# Patient Record
Sex: FEMALE | Race: WHITE
[De-identification: names, ages, dates, MRNs, and addresses within clinical notes are randomized per-mention and may not be internally consistent; named-entity substitution may affect disease eponyms.]

---

## 2023-01-01 ENCOUNTER — HOSPITAL ENCOUNTER (EMERGENCY)
Dept: HOSPITAL 33 - ED | Age: 48
Discharge: HOME | End: 2023-01-01
Payer: COMMERCIAL

## 2023-01-01 VITALS — SYSTOLIC BLOOD PRESSURE: 114 MMHG | HEART RATE: 60 BPM | DIASTOLIC BLOOD PRESSURE: 63 MMHG

## 2023-01-01 VITALS — OXYGEN SATURATION: 98 %

## 2023-01-01 DIAGNOSIS — Z28.310: ICD-10-CM

## 2023-01-01 DIAGNOSIS — Z79.899: ICD-10-CM

## 2023-01-01 DIAGNOSIS — E11.9: ICD-10-CM

## 2023-01-01 DIAGNOSIS — Z72.0: ICD-10-CM

## 2023-01-01 DIAGNOSIS — I10: ICD-10-CM

## 2023-01-01 DIAGNOSIS — R11.2: ICD-10-CM

## 2023-01-01 DIAGNOSIS — E78.5: ICD-10-CM

## 2023-01-01 DIAGNOSIS — Z79.84: ICD-10-CM

## 2023-01-01 DIAGNOSIS — R06.02: ICD-10-CM

## 2023-01-01 DIAGNOSIS — R07.89: Primary | ICD-10-CM

## 2023-01-01 LAB
ALBUMIN SERPL-MCNC: 4.4 G/DL (ref 3.5–5)
ALP SERPL-CCNC: 48 U/L (ref 38–126)
ALT SERPL-CCNC: 40 U/L (ref 0–35)
AMYLASE SERPL-CCNC: 88 U/L (ref 30–110)
ANION GAP SERPL CALC-SCNC: 10.6 MEQ/L (ref 5–15)
AST SERPL QL: 35 U/L (ref 14–36)
BASOPHILS # BLD AUTO: 0.1 X10^3/UL (ref 0–0.4)
BILIRUB BLD-MCNC: 0.3 MG/DL (ref 0.2–1.3)
BNP SERPL-MCNC: 124 PG/ML (ref 0–450)
BUN SERPL-MCNC: 20 MG/DL (ref 7–17)
CALCIUM SPEC-MCNC: 9.1 MG/DL (ref 8.4–10.2)
CHLORIDE SERPL-SCNC: 108 MMOL/L (ref 98–107)
CO2 SERPL-SCNC: 24 MMOL/L (ref 22–30)
CREAT SERPL-MCNC: 0.81 MG/DL (ref 0.52–1.04)
EOSINOPHIL # BLD AUTO: 0.58 X10^3/UL (ref 0–0.5)
GFR SERPLBLD BASED ON 1.73 SQ M-ARVRAT: > 60 ML/MIN
GLUCOSE SERPL-MCNC: 98 MG/DL (ref 74–106)
GLUCOSE UR-MCNC: NEGATIVE MG/DL
HCT VFR BLD AUTO: 41 % (ref 35–47)
HGB BLD-MCNC: 13.3 G/DL (ref 12–16)
LIPASE SERPL-CCNC: 145 U/L (ref 23–300)
LYMPHOCYTES # SPEC AUTO: 2.58 X10^3/UL (ref 1–4.6)
MCH RBC QN AUTO: 29.9 PG (ref 26–32)
MCHC RBC AUTO-ENTMCNC: 32.4 G/DL (ref 32–36)
MONOCYTES # BLD AUTO: 0.62 X10^3/UL (ref 0–1.3)
PLATELET # BLD AUTO: 260 X10^3/UL (ref 150–450)
POTASSIUM SERPLBLD-SCNC: 4.6 MMOL/L (ref 3.5–5.1)
PROT SERPL-MCNC: 7.4 G/DL (ref 6.3–8.2)
PROT UR STRIP-MCNC: NEGATIVE MG/DL
RBC # BLD AUTO: 4.45 X10^6/UL (ref 4.1–5.4)
RBC # UR AUTO: (no result) ERY/UL (ref 0–5)
RBC #/AREA URNS HPF: (no result) /HPF (ref 0–2)
SODIUM SERPL-SCNC: 138 MMOL/L (ref 137–145)
UA DIPSTICK PNL UR: (no result)
URINE CULTURED INDICATED?: NO
WBC # BLD AUTO: 7.6 X10^3/UL (ref 4–10.5)
WBC #/AREA URNS HPF: (no result) /HPF (ref 0–5)

## 2023-01-01 PROCEDURE — 82150 ASSAY OF AMYLASE: CPT

## 2023-01-01 PROCEDURE — 84484 ASSAY OF TROPONIN QUANT: CPT

## 2023-01-01 PROCEDURE — 80053 COMPREHEN METABOLIC PANEL: CPT

## 2023-01-01 PROCEDURE — 93005 ELECTROCARDIOGRAM TRACING: CPT

## 2023-01-01 PROCEDURE — 83880 ASSAY OF NATRIURETIC PEPTIDE: CPT

## 2023-01-01 PROCEDURE — 85025 COMPLETE CBC W/AUTO DIFF WBC: CPT

## 2023-01-01 PROCEDURE — 81015 MICROSCOPIC EXAM OF URINE: CPT

## 2023-01-01 PROCEDURE — 85379 FIBRIN DEGRADATION QUANT: CPT

## 2023-01-01 PROCEDURE — 36415 COLL VENOUS BLD VENIPUNCTURE: CPT

## 2023-01-01 PROCEDURE — 71045 X-RAY EXAM CHEST 1 VIEW: CPT

## 2023-01-01 PROCEDURE — 83690 ASSAY OF LIPASE: CPT

## 2023-01-01 PROCEDURE — 99284 EMERGENCY DEPT VISIT MOD MDM: CPT

## 2023-01-01 PROCEDURE — 83605 ASSAY OF LACTIC ACID: CPT

## 2023-01-01 PROCEDURE — 96374 THER/PROPH/DIAG INJ IV PUSH: CPT

## 2023-01-01 NOTE — ERPHSYRPT
- History of Present Illness


Time Seen by Provider: 01/01/23 10:50


Historian: patient


Exam Limitations: no limitations


Allergies/Adverse Reactions: 








amoxicillin Allergy (Verified 01/01/23 10:49)


   


ampicillin Allergy (Verified 01/01/23 10:49)


   








- Past Medical History


Neurological History: Migraines, Seizures


Cardiac History: High Cholesterol, Hypertension


Respiratory History: Bronchitis


Endocrine Medical History: Diabetes Type II, Hypothyroidism


Musculoskeletal History: Arthritis





- Departure


Referrals: 


RACHAEL LAMB MD [Primary Care Provider] - Follow up/PCP as directed

## 2023-01-01 NOTE — XRAY
Indication: Chest pain.  Short of breath.



Comparison: None



Portable chest inflated and clear with incidental tiny left lung calcified

granuloma.  Heart not enlarged.  Bony thorax intact.



Impression: Nonacute chest.

## 2023-01-01 NOTE — ERPHSYRPT
- History of Present Illness


Time Seen by Provider: 23 10:50


Historian: patient


Exam Limitations: no limitations


Patient Subjective Stated Complaint: C/O chest pain that started aroun 0830 or 

0900 today. Patient states that the pain awoke her from her sleep. She took one 

nitro at home prior to coming into the ER.


Triage Nursing Assessment: Patient ambulated back to ED; refused a w/c. She is 

alert and oriented. NO SOB and no cough noted. GREEN[place] WNL. Skin tone normal.


Physician History: 





Patient is a 47-year-old white female who presents with a complaint of chest 

pain going on the left arm without relief with 1 nitroglycerin.  She had her 

last episode of chest pain approximately a week ago she has had no history of MI

no catheterization she has had nausea vomiting and shortness of breath she has 

had some wheezing.  She is also a smoker she is hypertensive family history is 

positive she is diabetic and she has hyperlipidemia.


Timing/Duration: today


Activities at Onset: none


Quality: fullness


Location: substernal


Chest Pain Radiation: arm


Severity of Pain-Max: moderate


Severity of Pain-Current: mild


Associated Symptoms: nausea, vomiting, shortness of breath, diaphoresis


Prior Chest Pain/Cardiac Workup: no prior cardiac workup


Nitro Today/Relief: 0.4 mg x 1


Aspirin Treatment Today: no aspirin today


Allergies/Adverse Reactions: 








amoxicillin Allergy (Verified 23 10:49)


   


ampicillin Allergy (Verified 23 10:49)


   


Influenza Virus Vaccines Allergy (Verified 23 11:03)


   


morphine Adverse Reaction (Verified 23 11:03)


   





Home Medications: 








Cyclobenzaprine HCl 10 mg*** [Cyclobenzaprine 10 MG***] 10 mg PO TID 23 

[History]


Divalproex Sodium [Depakote ER] 500 mg PO BID 23 [History]


Doxepin HCl 10 mg PO HS 23 [History]


Fenofibric Acid (Choline) [Trilipix] 135 mg PO DAILY 23 [History]


Ibuprofen [Advil Migraine] 200 mg PO DAILY PRN PRN 23 [History]


Levothyroxine Sodium [Levothyroxine] 137 mcg PO DAILY 23 [History]


Mag Oxide/D3/Turmeric Rt Xt [Magnesium-Vit D3-Turmeric Tab] 400 mg PO HS 

23 [History]


Metformin HCl [Metformin HCl ER] 500 mg PO DAILY 23 [History]


Mv-Mn/Iron/Folic Acid/Herb 190 [Vitamin D3 Complete Caplet] 50,000 iu PO WEEKLY 

23 [History]


Nitroglycerin 0.4 mg Tablet*** [Nitrostat 0.4 MG Tablet***] 0.4 mg SL DAILY PRN 

PRN 23 [History]


Pravastatin Sodium 20 mg PO DAILY 23 [History]


Rizatriptan Benzoate [Rizatriptan] 10 mg PO DAILY PRN PRN 23 [History]


Ropinirole HCl 1 mg PO HS 23 [History]


atenoloL [Atenolol] 25 mg PO DAILY 23 [History]





Hx Tetanus, Diphtheria Vaccination/Date Given: Yes


Hx Influenza Vaccination/Date Given: No (Allergy)


Hx Pneumococcal Vaccination/Date Given: No


Immunizations Up to Date: Yes





Travel Risk





- International Travel


Have you traveled outside of the country in past 3 weeks: No





- Coronavirus Screening


Are you exhibiting any of the following symptoms?: No


Close contact with a COVID-19 positive Pt in past 14-21 Days: No





- Vaccine Status


Have you recieved a Covid-19 vaccination: No





- Review of Systems


Constitutional: No Fever, No Chills


Eyes: No Symptoms


Ears, Nose, & Throat: No Symptoms


Respiratory: Cough, No Dyspnea


Cardiac: No Chest Pain, No Edema, No Syncope


Abdominal/Gastrointestinal: No Abdominal Pain, No Nausea, No Vomiting, No 

Diarrhea


Genitourinary Symptoms: No Dysuria


Musculoskeletal: No Back Pain, No Neck Pain


Skin: No Rash


Neurological: No Dizziness, No Focal Weakness, No Sensory Changes


Psychological: No Symptoms


Endocrine: No Symptoms


All Other Systems: Reviewed and Negative





- Past Medical History


Neurological History: Migraines, Seizures


Cardiac History: High Cholesterol, Hypertension


Respiratory History: Bronchitis


Endocrine Medical History: Diabetes Type II, Hypothyroidism


Musculoskeletal History: Arthritis





- Past Surgical History


Past Surgical History: Yes


Female Surgical History:  Section


Other Surgical History: Breast biopsies, thyroid removed





- Social History


Smoking Status: Current every day smoker


How long have you smoked: 11 y.o.


Exposure to second hand smoke: No


Drug Use: none


Patient Lives Alone: No





- Female History


Hx Last Menstrual Period: No longer has these


Hx Pregnant Now: No





- Nursing Vital Signs


Nursing Vital Signs: 





                               Initial Vital Signs











Temperature  98.3 F   23 10:49


 


Pulse Rate  71   23 10:49


 


Respiratory Rate  18   23 10:49


 


Blood Pressure  161/98   23 10:49


 


O2 Sat by Pulse Oximetry  97   23 10:49








                                   Pain Scale











Pain Intensity                 0

















- Physical Exam


General Appearance: no apparent distress, alert


Eye Exam: PERRL/EOMI, eyes nml inspection


Ears, Nose, Throat Exam: normal ENT inspection, moist mucous membranes


Neck Exam: normal inspection, non-tender, supple, full range of motion


Respiratory Exam: normal breath sounds, lungs clear, No respiratory distress


Cardiovascular Exam: regular rate/rhythm, normal heart sounds


Gastrointestinal/Abdomen Exam: soft, No tenderness, No mass


Back Exam: normal inspection, No CVA tenderness, No vertebral tenderness


Extremity Exam: normal inspection, normal range of motion


Neurologic Exam: alert, oriented x 3, cooperative, normal mood/affect, sensation

 nml, No motor deficits


Skin Exam: normal color, warm, dry


SpO2: 98





- Course


Nursing assessment & vital signs reviewed: Yes





- Radiology Exams


  ** Chest


X-ray Interpretation: Interpreted by me, Negative


Ordered Tests: 





                               Active Orders 24 hr











 Category Date Time Status


 


 EKG-ER Only STAT Care  23 10:50 Active


 


 CHEST 1 VIEW (PORTABLE) Stat Exams  23 10:50 Taken


 


 AMYLASE Stat Lab  23 11:15 Completed


 


 CBC W DIFF Stat Lab  23 11:15 Completed


 


 CMP Stat Lab  23 11:15 Completed


 


 D-DIMER QUANTITATIVE Stat Lab  23 11:15 Completed


 


 LIPASE Stat Lab  23 11:15 Completed


 


 Lactic Acid Stat Lab  23 11:25 Completed


 


 NT PRO BNP Stat Lab  23 11:15 Completed


 


 TROPONIN Q4H Lab  23 11:15 Completed


 


 TROPONIN Q4H Lab  23 13:25 Completed


 


 TROPONIN Q4H Lab  23 19:00 Ordered


 


 UA W/RFX CULTURE Stat Lab  23 11:44 Completed








Medication Summary











Generic Name Dose Route Start Last Admin





  Trade Name Freq  PRN Reason Stop Dose Admin


 


Sodium Chloride  1,000 mls @ 100 mls/hr  23 11:00  23 11:13





  Sodium Chloride 0.9% 1000 Ml  IV  23 10:59  100 mls/hr





  .Q10H DONA   Administration














Discontinued Medications














Generic Name Dose Route Start Last Admin





  Trade Name Alan  PRN Reason Stop Dose Admin


 


Fentanyl Citrate  50 mcg  23 10:50  23 11:13





  Fentanyl Citrate 100 Mcg/2 Ml* Vial  IV  23 10:51  50 mcg





  STAT ONE   Administration


 


Fentanyl Citrate  Confirm  23 11:12 





  Fentanyl Citrate 100 Mcg/2 Ml* Vial  Administered  23 11:13 





  Dose  





  100 mcg  





  .ROUTE  





  .[employer] ONE  











Lab/Rad Data: 





                           Laboratory Result Diagrams





                                 23 11:15 





                                 23 11:15 





                               Laboratory Results











  23 Range/Units





  13:25 11:44 11:25 


 


WBC     (4.0-10.5)  x10^3/uL


 


RBC     (4.1-5.4)  x10^6/uL


 


Hgb     (12.0-16.0)  g/dL


 


Hct     (35-47)  %


 


MCV     ()  fL


 


MCH     (26-32)  pg


 


MCHC     (32-36)  g/dL


 


RDW     (11.5-14.0)  %


 


Plt Count     (150-450)  x10^3/uL


 


MPV     (7.5-11.0)  fL


 


Gran %     (36.0-66.0)  %


 


Immature Gran % (Auto)     (0.00-0.4)  %


 


Nucleat RBC Rel Count     (0.00-0.1)  %


 


Eos # (Auto)     (0-0.5)  x10^3/uL


 


Immature Gran # (Auto)     (0.00-0.03)  x10^3u/L


 


Absolute Lymphs (auto)     (1.0-4.6)  x10^3/uL


 


Absolute Monos (auto)     (0.0-1.3)  x10^3/uL


 


Absolute Nucleated RBC     (0.00-0.01)  x10^3u/L


 


Lymphocytes %     (24.0-44.0)  %


 


Monocytes %     (0.0-12.0)  %


 


Eosinophils %     (0.00-5.0)  %


 


Basophils %     (0.0-0.4)  %


 


Absolute Granulocytes     (1.4-6.9)  x10^3/uL


 


Basophils #     (0-0.4)  x10^3/uL


 


D-Dimer     (0.0-0.50)  mg/L


 


Sodium     (137-145)  mmol/L


 


Potassium     (3.5-5.1)  mmol/L


 


Chloride     ()  mmol/L


 


Carbon Dioxide     (22-30)  mmol/L


 


Anion Gap     (5-15)  MEQ/L


 


BUN     (7-17)  mg/dL


 


Creatinine     (0.52-1.04)  mg/dL


 


Estimated GFR     ML/MIN


 


Glucose     ()  mg/dL


 


Lactic Acid    1.1  (0.4-2.0)  


 


Calcium     (8.4-10.2)  mg/dL


 


Total Bilirubin     (0.2-1.3)  mg/dL


 


AST     (14-36)  U/L


 


ALT     (0-35)  U/L


 


Alkaline Phosphatase     ()  U/L


 


Troponin I  < 0.012    (0.000-0.034)  ng/mL


 


NT-Pro-B Natriuret Pep     (0-450)  pg/mL


 


Serum Total Protein     (6.3-8.2)  g/dL


 


Albumin     (3.5-5.0)  g/dL


 


Amylase     ()  U/L


 


Lipase     ()  U/L


 


Urinalys Dipstick Clnc   MAIN LAB   


 


Urine Color   YELLOW   (YELLOW)  


 


Urine Appearance   CLEAR   (CLEAR)  


 


Urine pH   5.5   (5-6)  


 


Ur Specific Gravity   1.020   (1.005-1.025)  


 


POC Urine Protein Conf   NEGATIVE   (Negative)  


 


Urine Ketones   NEGATIVE   (NEGATIVE)  


 


Urine Nitrite   NEGATIVE   (NEGATIVE)  


 


Urine Bilirubin   NEGATIVE   (NEGATIVE)  


 


Urine Urobilinogen   0.2   (0-1)  mg/dL


 


Urine Leukocytes   NEGATIVE   (NEGATIVE)  


 


Urine WBC (Auto)   0-2   (0-5)  /HPF


 


Urine RBC (Auto)   3-5 A   (0-2)  /HPF


 


U Epithel Cells (Auto)   RARE   (FEW)  /HPF


 


Urine Bacteria (Auto)   NONE   (NEGATIVE)  /HPF


 


Urine RBC   MODERATE A   (0-5)  Anthony/ul


 


Ur Culture Indicated?   NO   


 


Urine Glucose   NEGATIVE   (NEGATIVE)  mg/dL














  23 Range/Units





  11:15 11:15 11:15 


 


WBC     (4.0-10.5)  x10^3/uL


 


RBC     (4.1-5.4)  x10^6/uL


 


Hgb     (12.0-16.0)  g/dL


 


Hct     (35-47)  %


 


MCV     ()  fL


 


MCH     (26-32)  pg


 


MCHC     (32-36)  g/dL


 


RDW     (11.5-14.0)  %


 


Plt Count     (150-450)  x10^3/uL


 


MPV     (7.5-11.0)  fL


 


Gran %     (36.0-66.0)  %


 


Immature Gran % (Auto)     (0.00-0.4)  %


 


Nucleat RBC Rel Count     (0.00-0.1)  %


 


Eos # (Auto)     (0-0.5)  x10^3/uL


 


Immature Gran # (Auto)     (0.00-0.03)  x10^3u/L


 


Absolute Lymphs (auto)     (1.0-4.6)  x10^3/uL


 


Absolute Monos (auto)     (0.0-1.3)  x10^3/uL


 


Absolute Nucleated RBC     (0.00-0.01)  x10^3u/L


 


Lymphocytes %     (24.0-44.0)  %


 


Monocytes %     (0.0-12.0)  %


 


Eosinophils %     (0.00-5.0)  %


 


Basophils %     (0.0-0.4)  %


 


Absolute Granulocytes     (1.4-6.9)  x10^3/uL


 


Basophils #     (0-0.4)  x10^3/uL


 


D-Dimer   0.24   (0.0-0.50)  mg/L


 


Sodium    138  (137-145)  mmol/L


 


Potassium    4.6  (3.5-5.1)  mmol/L


 


Chloride    108 H  ()  mmol/L


 


Carbon Dioxide    24  (22-30)  mmol/L


 


Anion Gap    10.6  (5-15)  MEQ/L


 


BUN    20 H  (7-17)  mg/dL


 


Creatinine    0.81  (0.52-1.04)  mg/dL


 


Estimated GFR    > 60.0  ML/MIN


 


Glucose    98  ()  mg/dL


 


Lactic Acid     (0.4-2.0)  


 


Calcium    9.1  (8.4-10.2)  mg/dL


 


Total Bilirubin    0.30  (0.2-1.3)  mg/dL


 


AST    35  (14-36)  U/L


 


ALT    40 H  (0-35)  U/L


 


Alkaline Phosphatase    48  ()  U/L


 


Troponin I  < 0.012    (0.000-0.034)  ng/mL


 


NT-Pro-B Natriuret Pep    124  (0-450)  pg/mL


 


Serum Total Protein    7.4  (6.3-8.2)  g/dL


 


Albumin    4.4  (3.5-5.0)  g/dL


 


Amylase    88  ()  U/L


 


Lipase    145  ()  U/L


 


Urinalys Dipstick Clnc     


 


Urine Color     (YELLOW)  


 


Urine Appearance     (CLEAR)  


 


Urine pH     (5-6)  


 


Ur Specific Gravity     (1.005-1.025)  


 


POC Urine Protein Conf     (Negative)  


 


Urine Ketones     (NEGATIVE)  


 


Urine Nitrite     (NEGATIVE)  


 


Urine Bilirubin     (NEGATIVE)  


 


Urine Urobilinogen     (0-1)  mg/dL


 


Urine Leukocytes     (NEGATIVE)  


 


Urine WBC (Auto)     (0-5)  /HPF


 


Urine RBC (Auto)     (0-2)  /HPF


 


U Epithel Cells (Auto)     (FEW)  /HPF


 


Urine Bacteria (Auto)     (NEGATIVE)  /HPF


 


Urine RBC     (0-5)  Anthony/ul


 


Ur Culture Indicated?     


 


Urine Glucose     (NEGATIVE)  mg/dL














  23 Range/Units





  11:15 


 


WBC  7.6  (4.0-10.5)  x10^3/uL


 


RBC  4.45  (4.1-5.4)  x10^6/uL


 


Hgb  13.3  (12.0-16.0)  g/dL


 


Hct  41.0  (35-47)  %


 


MCV  92.1  ()  fL


 


MCH  29.9  (26-32)  pg


 


MCHC  32.4  (32-36)  g/dL


 


RDW  13.1  (11.5-14.0)  %


 


Plt Count  260  (150-450)  x10^3/uL


 


MPV  10.5  (7.5-11.0)  fL


 


Gran %  48.5  (36.0-66.0)  %


 


Immature Gran % (Auto)  0.3  (0.00-0.4)  %


 


Nucleat RBC Rel Count  0.0  (0.00-0.1)  %


 


Eos # (Auto)  0.58 H  (0-0.5)  x10^3/uL


 


Immature Gran # (Auto)  0.02  (0.00-0.03)  x10^3u/L


 


Absolute Lymphs (auto)  2.58  (1.0-4.6)  x10^3/uL


 


Absolute Monos (auto)  0.62  (0.0-1.3)  x10^3/uL


 


Absolute Nucleated RBC  0.00  (0.00-0.01)  x10^3u/L


 


Lymphocytes %  34.0  (24.0-44.0)  %


 


Monocytes %  8.2  (0.0-12.0)  %


 


Eosinophils %  7.7 H  (0.00-5.0)  %


 


Basophils %  1.3  (0.0-0.4)  %


 


Absolute Granulocytes  3.68  (1.4-6.9)  x10^3/uL


 


Basophils #  0.10  (0-0.4)  x10^3/uL


 


D-Dimer   (0.0-0.50)  mg/L


 


Sodium   (137-145)  mmol/L


 


Potassium   (3.5-5.1)  mmol/L


 


Chloride   ()  mmol/L


 


Carbon Dioxide   (22-30)  mmol/L


 


Anion Gap   (5-15)  MEQ/L


 


BUN   (7-17)  mg/dL


 


Creatinine   (0.52-1.04)  mg/dL


 


Estimated GFR   ML/MIN


 


Glucose   ()  mg/dL


 


Lactic Acid   (0.4-2.0)  


 


Calcium   (8.4-10.2)  mg/dL


 


Total Bilirubin   (0.2-1.3)  mg/dL


 


AST   (14-36)  U/L


 


ALT   (0-35)  U/L


 


Alkaline Phosphatase   ()  U/L


 


Troponin I   (0.000-0.034)  ng/mL


 


NT-Pro-B Natriuret Pep   (0-450)  pg/mL


 


Serum Total Protein   (6.3-8.2)  g/dL


 


Albumin   (3.5-5.0)  g/dL


 


Amylase   ()  U/L


 


Lipase   ()  U/L


 


Urinalys Dipstick Clnc   


 


Urine Color   (YELLOW)  


 


Urine Appearance   (CLEAR)  


 


Urine pH   (5-6)  


 


Ur Specific Gravity   (1.005-1.025)  


 


POC Urine Protein Conf   (Negative)  


 


Urine Ketones   (NEGATIVE)  


 


Urine Nitrite   (NEGATIVE)  


 


Urine Bilirubin   (NEGATIVE)  


 


Urine Urobilinogen   (0-1)  mg/dL


 


Urine Leukocytes   (NEGATIVE)  


 


Urine WBC (Auto)   (0-5)  /HPF


 


Urine RBC (Auto)   (0-2)  /HPF


 


U Epithel Cells (Auto)   (FEW)  /HPF


 


Urine Bacteria (Auto)   (NEGATIVE)  /HPF


 


Urine RBC   (0-5)  Anthony/ul


 


Ur Culture Indicated?   


 


Urine Glucose   (NEGATIVE)  mg/dL














- Progress


Progress: unchanged


Air Movement: good


Blood Culture(s) Obtained: No


Antibiotics given: No





- Departure


Departure Disposition: Home


Clinical Impression: 


 Atypical chest pain





Condition: Stable


Critical Care Time: No


Referrals: 


RACHAEL LAMB MD [Primary Care Provider] - Follow up/PCP as directed


Instructions:  Chest Pain (DC)